# Patient Record
Sex: FEMALE | Race: OTHER | Employment: UNEMPLOYED | URBAN - METROPOLITAN AREA
[De-identification: names, ages, dates, MRNs, and addresses within clinical notes are randomized per-mention and may not be internally consistent; named-entity substitution may affect disease eponyms.]

---

## 2017-06-01 ENCOUNTER — APPOINTMENT (OUTPATIENT)
Dept: GENERAL RADIOLOGY | Age: 52
End: 2017-06-01
Attending: EMERGENCY MEDICINE
Payer: SELF-PAY

## 2017-06-01 ENCOUNTER — HOSPITAL ENCOUNTER (EMERGENCY)
Age: 52
Discharge: HOME | End: 2017-06-01
Attending: EMERGENCY MEDICINE
Payer: SELF-PAY

## 2017-06-01 VITALS
DIASTOLIC BLOOD PRESSURE: 69 MMHG | HEART RATE: 93 BPM | RESPIRATION RATE: 18 BRPM | SYSTOLIC BLOOD PRESSURE: 105 MMHG | TEMPERATURE: 97.7 F | OXYGEN SATURATION: 100 %

## 2017-06-01 DIAGNOSIS — M79.642 BILATERAL HAND PAIN: ICD-10-CM

## 2017-06-01 DIAGNOSIS — M19.90 ARTHRITIS: Primary | ICD-10-CM

## 2017-06-01 DIAGNOSIS — M25.562 ACUTE PAIN OF BOTH KNEES: ICD-10-CM

## 2017-06-01 DIAGNOSIS — M79.641 BILATERAL HAND PAIN: ICD-10-CM

## 2017-06-01 DIAGNOSIS — M25.561 ACUTE PAIN OF BOTH KNEES: ICD-10-CM

## 2017-06-01 PROCEDURE — 96372 THER/PROPH/DIAG INJ SC/IM: CPT

## 2017-06-01 PROCEDURE — 73130 X-RAY EXAM OF HAND: CPT

## 2017-06-01 PROCEDURE — 74011250637 HC RX REV CODE- 250/637: Performed by: EMERGENCY MEDICINE

## 2017-06-01 PROCEDURE — 99282 EMERGENCY DEPT VISIT SF MDM: CPT

## 2017-06-01 PROCEDURE — 73564 X-RAY EXAM KNEE 4 OR MORE: CPT

## 2017-06-01 PROCEDURE — 74011250636 HC RX REV CODE- 250/636: Performed by: EMERGENCY MEDICINE

## 2017-06-01 RX ORDER — KETOROLAC TROMETHAMINE 30 MG/ML
15 INJECTION, SOLUTION INTRAMUSCULAR; INTRAVENOUS
Status: COMPLETED | OUTPATIENT
Start: 2017-06-01 | End: 2017-06-01

## 2017-06-01 RX ORDER — OXYCODONE AND ACETAMINOPHEN 5; 325 MG/1; MG/1
TABLET ORAL
Qty: 20 TAB | Refills: 0 | Status: SHIPPED | OUTPATIENT
Start: 2017-06-01

## 2017-06-01 RX ORDER — OXYCODONE AND ACETAMINOPHEN 5; 325 MG/1; MG/1
1 TABLET ORAL
Status: COMPLETED | OUTPATIENT
Start: 2017-06-01 | End: 2017-06-01

## 2017-06-01 RX ORDER — PREDNISONE 50 MG/1
50 TABLET ORAL DAILY
Qty: 5 TAB | Refills: 0 | Status: SHIPPED | OUTPATIENT
Start: 2017-06-01 | End: 2017-06-06

## 2017-06-01 RX ADMIN — KETOROLAC TROMETHAMINE 15 MG: 30 INJECTION, SOLUTION INTRAMUSCULAR; INTRAVENOUS at 14:53

## 2017-06-01 RX ADMIN — OXYCODONE HYDROCHLORIDE AND ACETAMINOPHEN 1 TABLET: 5; 325 TABLET ORAL at 15:08

## 2017-06-01 NOTE — ED PROVIDER NOTES
HPI Comments: 2:52 PM Kary Bryant is a 46 y.o. female who presents to the ED c/o arthritis in hands. Pt notes that she fell recently and has been unable to sleep. Pt also c/o shoulder, knee, foot pain, and frontal HA as associated sx. Pt denies CP, fever, nausea, smoking, and drinking. Pt has no other sx or complaints. Past Medical History:   Diagnosis Date    Arthritis        History reviewed. No pertinent surgical history. History reviewed. No pertinent family history. Social History     Social History    Marital status:      Spouse name: N/A    Number of children: N/A    Years of education: N/A     Occupational History    Not on file. Social History Main Topics    Smoking status: Never Smoker    Smokeless tobacco: Not on file    Alcohol use Not on file    Drug use: Not on file    Sexual activity: Not on file     Other Topics Concern    Not on file     Social History Narrative         ALLERGIES: Review of patient's allergies indicates no known allergies. Review of Systems   Constitutional: Negative. HENT: Negative. Eyes: Negative. Respiratory: Negative. Cardiovascular: Negative. Gastrointestinal: Negative. Endocrine: Negative. Genitourinary: Negative. Musculoskeletal: Positive for arthralgias. Skin: Negative. Allergic/Immunologic: Negative. Neurological: Negative. Hematological: Negative. Psychiatric/Behavioral: Negative. All other systems reviewed and are negative. Vitals:    06/01/17 1450   BP: 105/69   Pulse: 93   Resp: 18   Temp: 97.7 °F (36.5 °C)   SpO2: 100%            Physical Exam   Constitutional: She is oriented to person, place, and time. She appears well-developed and well-nourished. HENT:   Head: Normocephalic and atraumatic. Eyes: Conjunctivae and EOM are normal. Pupils are equal, round, and reactive to light. PERRLA 3   Neck: Normal range of motion. Neck supple.    Cardiovascular: Normal rate, regular rhythm, normal heart sounds and intact distal pulses. Pulses:       Radial pulses are 2+ on the right side, and 2+ on the left side. Dorsalis pedis pulses are 2+ on the right side, and 2+ on the left side. Cap refill <2   Pulmonary/Chest: Effort normal and breath sounds normal.   Abdominal: Soft. Bowel sounds are normal.   Musculoskeletal: Normal range of motion. Arthritic changes of bilateral hands and wrists as well as swelling at base of pointer finger; ankles; laterally at feet bilateral  No pretibial swelling   Neurological: She is alert and oriented to person, place, and time. Skin: Skin is warm and dry. Psychiatric: She has a normal mood and affect. Her behavior is normal. Judgment and thought content normal.   Nursing note and vitals reviewed. [facility]  ED Course       Procedures      Vitals:  Patient Vitals for the past 12 hrs:   Temp Pulse Resp BP SpO2   06/01/17 1450 97.7 °F (36.5 °C) 93 18 105/69 100 %       Medications ordered:   Medications   ketorolac (TORADOL) injection 15 mg (15 mg IntraMUSCular Given 6/1/17 1453)   oxyCODONE-acetaminophen (PERCOCET) 5-325 mg per tablet 1 Tab (1 Tab Oral Given 6/1/17 1508)         Lab findings:  No results found for this or any previous visit (from the past 12 hour(s)). EKG interpretation by ED Physician:    X-Ray, CT or other radiology findings or impressions:  XR HAND LT MIN 3 V   Final Result   IMPRESSION:   1. Mild soft tissue swelling over the metacarpal heads, without evidence of   fracture or retained opaque foreign object. 2. Multifocal osteoarthritic changes as above. XR HAND RT MIN 3 V   IMPRESSION:  1. No acute osseous abnormality involving the right hand. 2. Mild multifocal osteophytic changes above. XR KNEE RT MIN 4 V    IMPRESSION:  1. Tricompartmental right knee joint osteoarthritis with small joint effusion  and intra-articular ossified bodies. XR KNEE LT MIN 4 V    IMPRESSION:  1.  Medial compartment predominant tricompartmental left knee joint  osteoarthritis with small left knee joint effusion. All XRs interpreted by RAD       Progress notes, Consult notes or additional Procedure notes:   3:59 PM I have reassessed the patient and discussed their results and diagnosis. Pt will be discharged in stable condition. Patient is to return to emergency department if any new or worsening condition. Patient understands and verbalizes agreement with plan. Disposition:  Diagnosis:   1. Arthritis    2. Bilateral hand pain    3. Acute pain of both knees        Disposition:    Follow-up Information     Follow up With Details Comments Contact Info    Shona Priest MD Schedule an appointment as soon as possible for a visit in 2 days  [address] [facility] EMERGENCY DEPT  If symptoms worsen or any new concerns [address] [place] [address]  [phone]           Patient's Medications   Start Taking    OXYCODONE-ACETAMINOPHEN (PERCOCET) 5-325 MG PER TABLET    Take 1 tablet every 4-6 hours as needed for pain control. If you were instructed to try over the counter ibuprofen or tylenol, only take the percocet for pain not controlled with the over the counter medication. PREDNISONE (DELTASONE) 50 MG TABLET    Take 1 Tab by mouth daily for 5 days. Continue Taking    METHOTREXATE (RHEUMATREX) 2.5 MG TABLET    Take 2.5 mg by mouth. NAPROXEN (NAPROSYN) 500 MG TABLET    Take 500 mg by mouth two (2) times daily (with meals). These Medications have changed    No medications on file   Stop Taking    AMPICILLIN (PRINCIPEN) 500 MG CAPSULE    Take  by mouth Before breakfast, lunch, dinner and at bedtime. LEFLUNOMIDE (ARAVA) 20 MG TABLET    Take 20 mg by mouth daily. OXYCODONE-ACETAMINOPHEN (PERCOCET) 5-325 MG PER TABLET    Take 1 tablet every 4-6 hours as needed for pain control.   If you were instructed to try over the counter ibuprofen or tylenol, only take the percocet for pain not controlled with the over the counter medication. Scribe Attestation:   I, Brandy Barfield, am scribing for and in the presence of Edilia Parson MD on this day 06/01/17 at 2:52 PM   alec Katz    Provider Attestation:  I personally performed the services described in the documentation, reviewed the documentation, as recorded by the scribe in my presence, and it accurately and completely records my words and actions.   Edilia Parson MD. 2:52 PM      Signed by: Alec Katz, 2:52 PM